# Patient Record
Sex: FEMALE | Race: WHITE | URBAN - METROPOLITAN AREA
[De-identification: names, ages, dates, MRNs, and addresses within clinical notes are randomized per-mention and may not be internally consistent; named-entity substitution may affect disease eponyms.]

---

## 2021-01-05 ENCOUNTER — LAB OUTSIDE AN ENCOUNTER (OUTPATIENT)
Dept: URBAN - METROPOLITAN AREA CLINIC 40 | Facility: CLINIC | Age: 36
End: 2021-01-05

## 2021-01-05 ENCOUNTER — WEB ENCOUNTER (OUTPATIENT)
Dept: URBAN - METROPOLITAN AREA CLINIC 40 | Facility: CLINIC | Age: 36
End: 2021-01-05

## 2021-01-05 ENCOUNTER — OFFICE VISIT (OUTPATIENT)
Dept: URBAN - METROPOLITAN AREA CLINIC 40 | Facility: CLINIC | Age: 36
End: 2021-01-05
Payer: COMMERCIAL

## 2021-01-05 DIAGNOSIS — R10.811 RUQ ABDOMINAL TENDERNESS: ICD-10-CM

## 2021-01-05 DIAGNOSIS — K76.0 FATTY LIVER: ICD-10-CM

## 2021-01-05 PROBLEM — 197321007 FATTY LIVER: Status: ACTIVE | Noted: 2021-01-05

## 2021-01-05 PROCEDURE — G8483 FLU IMM NO ADMIN DOC REA: HCPCS | Performed by: INTERNAL MEDICINE

## 2021-01-05 PROCEDURE — G8427 DOCREV CUR MEDS BY ELIG CLIN: HCPCS | Performed by: INTERNAL MEDICINE

## 2021-01-05 PROCEDURE — 1036F TOBACCO NON-USER: CPT | Performed by: INTERNAL MEDICINE

## 2021-01-05 PROCEDURE — 99204 OFFICE O/P NEW MOD 45 MIN: CPT | Performed by: INTERNAL MEDICINE

## 2021-01-05 PROCEDURE — G8417 CALC BMI ABV UP PARAM F/U: HCPCS | Performed by: INTERNAL MEDICINE

## 2021-01-05 PROCEDURE — G9903 PT SCRN TBCO ID AS NON USER: HCPCS | Performed by: INTERNAL MEDICINE

## 2021-01-05 RX ORDER — DICYCLOMINE HYDROCHLORIDE 10 MG/1
1 TABLET CAPSULE ORAL
Qty: 360 TABLET | Refills: 3 | OUTPATIENT
Start: 2021-01-05 | End: 2021-12-31

## 2021-01-05 NOTE — PHYSICAL EXAM GASTROINTESTINAL
Abdomen , soft, mild RUQ and LOVE tenderness,  nondistended , no guarding or rigidity , no masses palpable , normal bowel sounds , Liver and Spleen , no hepatomegaly present , no hepatosplenomegaly , liver nontender , spleen not palpable

## 2021-01-05 NOTE — HPI-TODAY'S VISIT:
Ms. Lomas is a 35-year-old white female seen for right upper quadrant pain.  Patient states that since the beginning of November she has been having right-sided abdominal pain.  It radiates through into her back.  Pain seems to be worse with eating.  She has had issues with heartburn.  She was given omeprazole after visiting the emergency room at [place] with the symptoms on November 13th.  That ED report was reviewed. During that evaluation she had a right upper quadrant ultrasound that showed fatty liver but no gallstones or cholecystitis.   Labs  showed normal LFTS and lipase. Primary care physician sent her for a HIDA scan for further work-up.  She was performed at [place] on December 18.  This showed an ejection fraction of 73%.  Patient had a CT scan for right lower quadrant pain September 2019 at [facility].  This showed a normal pancreas liver and gallbladder.  She had a few scattered diverticuli in the colon.  Patient states heartburn and nausea are controlled since starting the omeprazole.  She denies any dysphagia.  She denies any NSAID use.  She is moving her bowels regularly.  There is no blood in the stool or melena.

## 2021-01-05 NOTE — PHYSICAL EXAM CONSTITUTIONAL:
Overweight WF well developed, well nourished , in no acute distress , ambulating without difficulty , normal communication ability

## 2021-01-11 ENCOUNTER — OFFICE VISIT (OUTPATIENT)
Dept: URBAN - METROPOLITAN AREA SURGERY CENTER 30 | Facility: SURGERY CENTER | Age: 36
End: 2021-01-11
Payer: COMMERCIAL

## 2021-01-11 DIAGNOSIS — K29.60 ADENOPAPILLOMATOSIS GASTRICA: ICD-10-CM

## 2021-01-11 DIAGNOSIS — K22.8 COLUMNAR-LINED ESOPHAGUS: ICD-10-CM

## 2021-01-11 PROCEDURE — 43239 EGD BIOPSY SINGLE/MULTIPLE: CPT | Performed by: INTERNAL MEDICINE

## 2021-01-11 PROCEDURE — G8907 PT DOC NO EVENTS ON DISCHARG: HCPCS | Performed by: INTERNAL MEDICINE

## 2021-01-27 ENCOUNTER — OFFICE VISIT (OUTPATIENT)
Dept: URBAN - METROPOLITAN AREA CLINIC 40 | Facility: CLINIC | Age: 36
End: 2021-01-27
Payer: COMMERCIAL

## 2021-01-27 DIAGNOSIS — K29.70 GASTRITIS: ICD-10-CM

## 2021-01-27 DIAGNOSIS — K20.90 ESOPHAGITIS DETERMINED BY ENDOSCOPY: ICD-10-CM

## 2021-01-27 DIAGNOSIS — R10.11 RUQ ABDOMINAL PAIN: ICD-10-CM

## 2021-01-27 PROBLEM — 4556007 GASTRITIS: Status: ACTIVE | Noted: 2021-01-27

## 2021-01-27 PROCEDURE — G8482 FLU IMMUNIZE ORDER/ADMIN: HCPCS | Performed by: INTERNAL MEDICINE

## 2021-01-27 PROCEDURE — G8417 CALC BMI ABV UP PARAM F/U: HCPCS | Performed by: INTERNAL MEDICINE

## 2021-01-27 PROCEDURE — G8427 DOCREV CUR MEDS BY ELIG CLIN: HCPCS | Performed by: INTERNAL MEDICINE

## 2021-01-27 PROCEDURE — 1036F TOBACCO NON-USER: CPT | Performed by: INTERNAL MEDICINE

## 2021-01-27 PROCEDURE — 99213 OFFICE O/P EST LOW 20 MIN: CPT | Performed by: INTERNAL MEDICINE

## 2021-01-27 RX ORDER — DICYCLOMINE HYDROCHLORIDE 10 MG/1
1 TABLET CAPSULE ORAL
Qty: 360 TABLET | Refills: 3 | COMMUNITY
Start: 2021-01-05 | End: 2021-12-31

## 2021-01-27 RX ORDER — DICYCLOMINE HYDROCHLORIDE 10 MG/1
1 TABLET CAPSULE ORAL
OUTPATIENT
Start: 2021-01-05 | End: 2021-12-31

## 2021-01-27 NOTE — HPI-TODAY'S VISIT:
Ms. Lomas presents to clinic for follow-up.  She was initially seen on January 5 complaining of right upper quadrant pain.  Right upper quadrant ultrasound showed fatty liver.  HIDA scan showed a normal gallbladder emptying.  She had been placed on omeprazole for reflux but still symptomatic.  Start her on dicyclomine and got an EGD.  This was performed on January 11.  This was significant for reflux esophagitis.  Gastritis was noted that was negative for H. pylori.  Patient presents today states she is doing better.  She thinks that the dicyclomine is helping a lot of her bloating.  She also is having issues with constipation that is improved as well.  She states the right-sided abdominal pain has resolved.  She denies any nausea or vomiting.  She denies any dysphagia.  She does have a history of migraine headaches.  She was recently started on Topamax and has noted a decreased appetite on that.  She denies any NSAID use for her headaches.

## 2021-04-21 ENCOUNTER — OFFICE VISIT (OUTPATIENT)
Dept: URBAN - METROPOLITAN AREA CLINIC 40 | Facility: CLINIC | Age: 36
End: 2021-04-21
Payer: COMMERCIAL

## 2021-04-21 ENCOUNTER — LAB OUTSIDE AN ENCOUNTER (OUTPATIENT)
Dept: URBAN - METROPOLITAN AREA CLINIC 40 | Facility: CLINIC | Age: 36
End: 2021-04-21

## 2021-04-21 DIAGNOSIS — K59.00 CONSTIPATION, UNSPECIFIED: ICD-10-CM

## 2021-04-21 DIAGNOSIS — R10.811 RUQ ABDOMINAL TENDERNESS: ICD-10-CM

## 2021-04-21 DIAGNOSIS — K20.90 ESOPHAGITIS DETERMINED BY ENDOSCOPY: ICD-10-CM

## 2021-04-21 PROBLEM — 14760008 CONSTIPATION: Status: ACTIVE | Noted: 2021-04-21

## 2021-04-21 PROCEDURE — 99214 OFFICE O/P EST MOD 30 MIN: CPT | Performed by: INTERNAL MEDICINE

## 2021-04-21 RX ORDER — DICYCLOMINE HYDROCHLORIDE 10 MG/1
1 TABLET CAPSULE ORAL
OUTPATIENT
Start: 2021-01-05 | End: 2021-12-31

## 2021-04-21 RX ORDER — DICYCLOMINE HYDROCHLORIDE 10 MG/1
1 TABLET CAPSULE ORAL
Status: ACTIVE | COMMUNITY
Start: 2021-01-05 | End: 2021-12-31

## 2021-04-21 RX ORDER — OMEPRAZOLE 40 MG/1
1 CAPSULE 30 MINUTES BEFORE MORNING MEAL CAPSULE, DELAYED RELEASE ORAL ONCE A DAY
Qty: 90 | Refills: 0

## 2021-04-21 RX ORDER — METOPROLOL TARTRATE 25 MG/1
AS DIRECTED TABLET, FILM COATED ORAL
Status: ACTIVE | COMMUNITY

## 2021-04-21 NOTE — HPI-TODAY'S VISIT:
Ms. Lomas is a clinic for follow-up.  She was last seen in January of this year.  Recall she had been complaining of right-sided abdominal pain.  Work-up prior to initial evaluation with us included right upper quadrant ultrasound that showed fatty liver but a normal gallbladder.  HIDA scan was also within normal limits.  We got an EGD which showed gastritis and reflux esophagitis.  She has been placed on omeprazole and doing well in that regard.  She was still having right-sided pain at her last visit so we started her on dicyclomine.  She states she is taking it 3 times a day but does not feel like this is helping her pain much.  She is noting some bilateral lower quadrant discomfort as well as a change in her bowels going every 2 days.  The stools are not hard.  There is no blood in the stool.  She has occasional mild nausea.  She thinks that this could be due to the dicyclomine but she is also been started on other medications for her blood pressure around the same time.

## 2021-05-17 ENCOUNTER — DASHBOARD ENCOUNTERS (OUTPATIENT)
Age: 36
End: 2021-05-17

## 2021-05-18 ENCOUNTER — OFFICE VISIT (OUTPATIENT)
Dept: URBAN - METROPOLITAN AREA CLINIC 40 | Facility: CLINIC | Age: 36
End: 2021-05-18

## 2021-05-18 RX ORDER — OMEPRAZOLE 40 MG/1
1 CAPSULE 30 MINUTES BEFORE MORNING MEAL CAPSULE, DELAYED RELEASE ORAL ONCE A DAY
Qty: 90 | Refills: 0 | Status: ACTIVE | COMMUNITY

## 2021-05-18 RX ORDER — METOPROLOL TARTRATE 25 MG/1
AS DIRECTED TABLET, FILM COATED ORAL
Status: ACTIVE | COMMUNITY